# Patient Record
Sex: MALE | Race: WHITE | URBAN - METROPOLITAN AREA
[De-identification: names, ages, dates, MRNs, and addresses within clinical notes are randomized per-mention and may not be internally consistent; named-entity substitution may affect disease eponyms.]

---

## 2018-01-18 ENCOUNTER — OFFICE VISIT (OUTPATIENT)
Dept: URGENT CARE | Facility: URGENT CARE | Age: 28
End: 2018-01-18

## 2018-01-18 VITALS
RESPIRATION RATE: 20 BRPM | DIASTOLIC BLOOD PRESSURE: 69 MMHG | OXYGEN SATURATION: 97 % | SYSTOLIC BLOOD PRESSURE: 121 MMHG | HEART RATE: 96 BPM | WEIGHT: 190.2 LBS | TEMPERATURE: 100 F

## 2018-01-18 DIAGNOSIS — A08.4 VIRAL GASTROENTERITIS: Primary | ICD-10-CM

## 2018-01-18 DIAGNOSIS — R10.84 GENERALIZED ABDOMINAL CRAMPS: ICD-10-CM

## 2018-01-18 PROCEDURE — 99203 OFFICE O/P NEW LOW 30 MIN: CPT | Performed by: PHYSICIAN ASSISTANT

## 2018-01-18 RX ORDER — ONDANSETRON 4 MG/1
4 TABLET, ORALLY DISINTEGRATING ORAL EVERY 8 HOURS PRN
Qty: 12 TABLET | Refills: 0 | Status: SHIPPED | OUTPATIENT
Start: 2018-01-18 | End: 2018-10-19

## 2018-01-18 NOTE — MR AVS SNAPSHOT
After Visit Summary   1/18/2018    Chapincito Barnard    MRN: [id number]           Patient Information     Date Of Birth          1990        Visit Information        Provider Department      1/18/2018 9:35 AM Mirza Patterson PA-C [facility]        Today's Diagnoses     Viral gastroenteritis    -  1    Generalized abdominal cramps          Care Instructions      Noninfectious Gastroenteritis (Ages 6 Years to Adult)    Gastroenteritis can cause nausea, vomiting, diarrhea, and abdominal cramping. This may occur as a result of food sensitivity, inflammation of your gastrointestinal tract, medicines, stress, or other causes not related to infection. Your symptoms will usually last from 1 to 3 days, but can last longer. Antibiotics are not effective, but simple home treatment will be helpful.  Home care  Medicine    You may use acetaminophen or NSAID medicines like ibuprofen or naproxen to control fever, unless another medicine is prescribed. (Note: If you have chronic liver or kidney disease, or ever had a stomach ulcer or gastrointestinalI bleeding, talk with your healthcare provider before using these medicines.) Aspirin should never be used in anyone under 18 years of age who is ill with a fever. It may cause severe liver damage. Don't increase your NSAID medicines if you are already taking these medicines for another condition (like arthritis). Don't use NSAIDS if you are on aspirin (such as for heart disease, or after a stroke).    If medicines for diarrhea or vomiting are prescribed, take only as directed.  General care and preventing spread of the illness    If symptoms are severe, rest at home for the next 24 hours or until you feel better.    Hand washing with soap and water is the best way to prevent the spread of infection. Wash your hands after touching anyone who is sick.    Wash your hands after using the toilet and before meals. Clean the toilet after each  use.    Caffeine, tobacco, and alcohol can make your diarrhea, cramping, and pain worse.  Diet    Water and clear liquids are important so you do not get dehydrated. Drink a small amount at a time.    Do not force yourself to eat, especially if you have cramps, vomiting, or diarrhea. When you finally decide to start eating, do not eat large amounts at a time, even if you are hungry.    If you eat, avoid fatty, greasy, spicy, or fried foods.    Do not eat dairy products if you have diarrhea; they can make the diarrhea worse.  During the first 24 hours (the first full day), follow the diet below:    Beverages: Water, clear liquids, soft drinks without caffeine, like ginger ale; mineral water (plain or flavored); decaffeinated tea and coffee.    Soups: Clear broth, consommé, and bouillon Sports drinks aren't a good choice because they have too much sugar and not enough electrolytes. In this case, commercially available products called oral rehydration solutions are best.    Desserts: Plain gelatin, popsicles, and fruit juice bars.  During the next 24 hours (the second day), you may add the following to the above if you have improved. If not, continue what you did the first day:    Hot cereal, plain toast, bread, rolls, crackers    Plain noodles, rice, mashed potatoes, chicken noodle or rice soup    Unsweetened canned fruit (avoid pineapple), bananas    Limit caffeine and chocolate. No spices or seasonings except salt.  During the next 24 hours    Gradually resume a normal diet, as you feel better and your symptoms improve.    If at any time your symptoms start getting worse, go back to clear liquids until you feel better.  Food preparation    If you have diarrhea, you should not prepare food for others. When you  prepare food for yourself, wash your hands before and after.    Wash your hands after using cutting boards, countertops, and knives that have been in contact with raw food.    Keep uncooked meats away from  cooked and ready-to-eat foods.  Follow-up care  Follow up with your healthcare provider if you are not improving over the next 2 to 3 days, or as advised. If a stool (diarrhea) sample was taken, call for the results as directed.  When to seek medical care  Call your healthcare provider right away if any of these occur:     Increasing abdominal pain or constant lower right abdominal pain    Continued vomiting (unable to keep liquids down)    Frequent diarrhea (more than 5 times a day)    Blood in vomit or stool (black or red color)    Inability to tolerate solid food after a few days.    Dark urine, reduced urine output    Weakness, dizziness    Drowsiness    Fever of 100.4 F (38.0 C) or higher, or as directed by your healthcare provider    New rash  Call 911  Call 911 if any of these occur:    Trouble breathing    Chest pain    Confusion    Severe drowsiness or trouble awakening    Seizure    Stiff neck  Date Last Reviewed: 11/16/2015 2000-2017 [employer]. [address] [place] [address]. All rights reserved. This information is not intended as a substitute for professional medical care. Always follow your healthcare professional's instructions.                Follow-ups after your visit        Who to contact     If you have questions or need follow up information about today's clinic visit or your schedule please contact [facility] directly at [phone].  Normal or non-critical lab and imaging results will be communicated to you by MyChart, letter or phone within 4 business days after the clinic has received the results. If you do not hear from us within 7 days, please contact the clinic through MyChart or phone. If you have a critical or abnormal lab result, we will notify you by phone as soon as possible.  Submit refill requests through [employer] or call your pharmacy and they will forward the refill request to us. Please allow 3 business days for your  "refill to be completed.          Additional Information About Your Visit        GaatuharPixtr Information     Live[employer] lets you send messages to your doctor, view your test results, renew your prescriptions, schedule appointments and more. To sign up, go to [email or web address] . Click on \"Log in\" on the left side of the screen, which will take you to the Welcome page. Then click on \"Sign up Now\" on the right side of the page.     You will be asked to enter the access code listed below, as well as some personal information. Please follow the directions to create your username and password.     Your access code is: [email or web address]  Expires: 2018 10:33 AM     Your access code will  in 90 days. If you need help or a new code, please call your [facility] clinic or [phone].        Care EveryWhere ID     This is your Care EveryWhere ID. This could be used by other organizations to access your [facility] medical records  [id number]        Your Vitals Were     Pulse Temperature Respirations Pulse Oximetry          96 100  F (37.8  C) (Oral) 20 97%         Blood Pressure from Last 3 Encounters:   18 121/69    Weight from Last 3 Encounters:   18 190 lb 3.2 oz (86.3 kg)              Today, you had the following     No orders found for display         Today's Medication Changes          These changes are accurate as of: 18 10:33 AM.  If you have any questions, ask your nurse or doctor.               Start taking these medicines.        Dose/Directions    ondansetron 4 MG ODT tab   Commonly known as:  ZOFRAN ODT   Used for:  Viral gastroenteritis, Generalized abdominal cramps   Started by:  Mirza Patterson PA-C        Dose:  4 mg   Take 1 tablet (4 mg) by mouth every 8 hours as needed for nausea   Quantity:  12 tablet   Refills:  0       ranitidine 150 MG tablet   Commonly known as:  ZANTAC   Used for:  Viral gastroenteritis, Generalized abdominal cramps   Started by:  Mirza Patterson PA-C        Dose:  " 150 mg   Take 1 tablet (150 mg) by mouth 2 times daily   Quantity:  60 tablet   Refills:  1            Where to get your medicines      These medications were sent to [facility] [address]  [address] [address]     Phone:  [phone]     ondansetron 4 MG ODT tab    ranitidine 150 MG tablet                Primary Care Provider Fax #    Physician No Ref-Primary [phone]       No address on file        Equal Access to Services     MARYBETH CHAU : Hadii aad ku hadasho Soomaali, waaxda luqadaha, qaybta kaalmada adeegyada, waxay idiin hayaan adeeg kharadonis lamigel . So [facility] [phone].    ATENCIÓN: Si habla español, tiene a vaughn disposición servicios gratuitos de asistencia lingüística. Llame al [phone].    We comply with applicable federal civil rights laws and [place] laws. We do not discriminate on the basis of race, color, national origin, age, disability, sex, sexual orientation, or gender identity.            Thank you!     Thank you for choosing [facility]  for your care. Our goal is always to provide you with excellent care. Hearing back from our patients is one way we can continue to improve our services. Please take a few minutes to complete the written survey that you may receive in the mail after your visit with us. Thank you!             Your Updated Medication List - Protect others around you: Learn how to safely use, store and throw away your medicines at www.disposemymeds.org.          This list is accurate as of: 1/18/18 10:33 AM.  Always use your most recent med list.                   Brand Name Dispense Instructions for use Diagnosis    ondansetron 4 MG ODT tab    ZOFRAN ODT    12 tablet    Take 1 tablet (4 mg) by mouth every 8 hours as needed for nausea    Viral gastroenteritis, Generalized abdominal cramps       PEPTO-BISMOL PO           ranitidine 150 MG tablet    ZANTAC    60 tablet    Take 1 tablet  (150 mg) by mouth 2 times daily    Viral gastroenteritis, Generalized abdominal cramps       UNABLE TO FIND      MEDICATION NAME: OTC cough med.

## 2018-01-18 NOTE — PROGRESS NOTES
SUBJECTIVE:  Chief Complaint   Patient presents with     Generalized Body Aches     abdominal pain , not sleeping well, nausea, chills , sweating, bodyache, groin area feels sensitive, fever x last night.     Chapincito Barnard is a 27 year old male whose symptoms began last night ago and include nausea, vomiting, stomach cramps, loose stools.    Symptoms are still present and moderate.    Aggravating factors: eating and drinking orange juice.    Alleviating factors:rest  Associated symptoms:  Pain: cramps  Fever: low grade  Diarrhea:  consists of mild loose stools/day and is persisting  Stools: loose  Appetite: decreased  Risk factors: gf has the same symptoms    No past medical history on file.     ALLERGIES  No Known Allergies     Social History   Substance Use Topics     Smoking status: Never Smoker     Smokeless tobacco: Not on file     Alcohol use Not on file       ROS:  CONSTITUTIONAL:POSITIVE  for low grade temp  INTEGUMENTARY/SKIN: NEGATIVE for worrisome rashes, moles or lesions  ENT/MOUTH: NEGATIVE for ear, mouth and throat problems  RESP:NEGATIVE for significant cough or SOB  CV: NEGATIVE for chest pain, palpitations or peripheral edema  GI: Positive for nausea, vomiting and stomach cramps  MUSCULOSKELETAL: NEGATIVE for significant arthralgias or myalgia  NEURO: NEGATIVE for weakness, dizziness or paresthesias    OBJECTIVE:  /69  Pulse 96  Temp 100  F (37.8  C) (Oral)  Resp 20  Wt 190 lb 3.2 oz (86.3 kg)  SpO2 97%  GENERAL APPEARANCE: healthy, alert and no distress  HENT: ear canals and TM's normal.  Nose and mouth without ulcers, erythema or lesions  NECK: supple, nontender, no lymphadenopathy  RESP: lungs clear to auscultation - no rales, rhonchi or wheezes  CV: regular rates and rhythm, normal S1 S2, no murmur noted  ABDOMEN:  soft, nontender, no HSM or masses and bowel sounds normal  NEURO: Normal strength and tone, sensory exam grossly normal,  normal speech and mentation  SKIN: no suspicious  "lesions or rashes    ASSESSMENT/PLAN\"      ICD-10-CM    1. Viral gastroenteritis A08.4 ondansetron (ZOFRAN ODT) 4 MG ODT tab     ranitidine (ZANTAC) 150 MG tablet   2. Generalized abdominal cramps R10.84 ondansetron (ZOFRAN ODT) 4 MG ODT tab     ranitidine (ZANTAC) 150 MG tablet       PLAN:  Diet: BRAT diet, advance diet as tolerated and small amounts clear fluids frequently,soups,juices,water,advance diet as tolerated  Orders Placed This Encounter             ondansetron (ZOFRAN ODT) 4 MG ODT tab     ranitidine (ZANTAC) 150 MG tablet         Follow-up with PCP if not improving    "

## 2018-01-18 NOTE — PATIENT INSTRUCTIONS
Noninfectious Gastroenteritis (Ages 6 Years to Adult)    Gastroenteritis can cause nausea, vomiting, diarrhea, and abdominal cramping. This may occur as a result of food sensitivity, inflammation of your gastrointestinal tract, medicines, stress, or other causes not related to infection. Your symptoms will usually last from 1 to 3 days, but can last longer. Antibiotics are not effective, but simple home treatment will be helpful.  Home care  Medicine    You may use acetaminophen or NSAID medicines like ibuprofen or naproxen to control fever, unless another medicine is prescribed. (Note: If you have chronic liver or kidney disease, or ever had a stomach ulcer or gastrointestinalI bleeding, talk with your healthcare provider before using these medicines.) Aspirin should never be used in anyone under 18 years of age who is ill with a fever. It may cause severe liver damage. Don't increase your NSAID medicines if you are already taking these medicines for another condition (like arthritis). Don't use NSAIDS if you are on aspirin (such as for heart disease, or after a stroke).    If medicines for diarrhea or vomiting are prescribed, take only as directed.  General care and preventing spread of the illness    If symptoms are severe, rest at home for the next 24 hours or until you feel better.    Hand washing with soap and water is the best way to prevent the spread of infection. Wash your hands after touching anyone who is sick.    Wash your hands after using the toilet and before meals. Clean the toilet after each use.    Caffeine, tobacco, and alcohol can make your diarrhea, cramping, and pain worse.  Diet    Water and clear liquids are important so you do not get dehydrated. Drink a small amount at a time.    Do not force yourself to eat, especially if you have cramps, vomiting, or diarrhea. When you finally decide to start eating, do not eat large amounts at a time, even if you are hungry.    If you eat, avoid  fatty, greasy, spicy, or fried foods.    Do not eat dairy products if you have diarrhea; they can make the diarrhea worse.  During the first 24 hours (the first full day), follow the diet below:    Beverages: Water, clear liquids, soft drinks without caffeine, like ginger ale; mineral water (plain or flavored); decaffeinated tea and coffee.    Soups: Clear broth, consommé, and bouillon Sports drinks aren't a good choice because they have too much sugar and not enough electrolytes. In this case, commercially available products called oral rehydration solutions are best.    Desserts: Plain gelatin, popsicles, and fruit juice bars.  During the next 24 hours (the second day), you may add the following to the above if you have improved. If not, continue what you did the first day:    Hot cereal, plain toast, bread, rolls, crackers    Plain noodles, rice, mashed potatoes, chicken noodle or rice soup    Unsweetened canned fruit (avoid pineapple), bananas    Limit caffeine and chocolate. No spices or seasonings except salt.  During the next 24 hours    Gradually resume a normal diet, as you feel better and your symptoms improve.    If at any time your symptoms start getting worse, go back to clear liquids until you feel better.  Food preparation    If you have diarrhea, you should not prepare food for others. When you  prepare food for yourself, wash your hands before and after.    Wash your hands after using cutting boards, countertops, and knives that have been in contact with raw food.    Keep uncooked meats away from cooked and ready-to-eat foods.  Follow-up care  Follow up with your healthcare provider if you are not improving over the next 2 to 3 days, or as advised. If a stool (diarrhea) sample was taken, call for the results as directed.  When to seek medical care  Call your healthcare provider right away if any of these occur:     Increasing abdominal pain or constant lower right abdominal pain    Continued  vomiting (unable to keep liquids down)    Frequent diarrhea (more than 5 times a day)    Blood in vomit or stool (black or red color)    Inability to tolerate solid food after a few days.    Dark urine, reduced urine output    Weakness, dizziness    Drowsiness    Fever of 100.4 F (38.0 C) or higher, or as directed by your healthcare provider    New rash  Call 911  Call 911 if any of these occur:    Trouble breathing    Chest pain    Confusion    Severe drowsiness or trouble awakening    Seizure    Stiff neck  Date Last Reviewed: 11/16/2015 2000-2017 [employer]. [address] [place] [address]. All rights reserved. This information is not intended as a substitute for professional medical care. Always follow your healthcare professional's instructions.

## 2018-07-23 ENCOUNTER — TRANSFERRED RECORDS (OUTPATIENT)
Dept: HEALTH INFORMATION MANAGEMENT | Facility: CLINIC | Age: 28
End: 2018-07-23

## 2018-07-27 DIAGNOSIS — R06.83 SNORING: Primary | ICD-10-CM

## 2018-07-27 DIAGNOSIS — G47.30 SLEEP APNEA: ICD-10-CM

## 2018-09-06 ENCOUNTER — OFFICE VISIT (OUTPATIENT)
Dept: SLEEP MEDICINE | Facility: CLINIC | Age: 28
End: 2018-09-06
Attending: OTOLARYNGOLOGY
Payer: COMMERCIAL

## 2018-09-06 DIAGNOSIS — G47.33 OSA (OBSTRUCTIVE SLEEP APNEA): Primary | ICD-10-CM

## 2018-09-06 DIAGNOSIS — G47.33 OBSTRUCTIVE SLEEP APNEA SYNDROME: ICD-10-CM

## 2018-09-06 DIAGNOSIS — R06.83 SNORING: ICD-10-CM

## 2018-09-06 NOTE — MR AVS SNAPSHOT
After Visit Summary   9/6/2018    Chapincito Barnard    MRN: [id number]           Patient Information     Date Of Birth          1990        Visit Information        Provider Department      9/6/2018 8:30 AM Ned Gamble MD [facility]        Today's Diagnoses     LADARIUS (obstructive sleep apnea)    -  1    Snoring        Sleep apnea           Follow-ups after your visit        Your next 10 appointments already scheduled     Sep 20, 2018  3:30 PM CDT   New Sleep Patient with Epi Guerin MD   [facility] ([facility] [place])    [address]  [facility] [address]   [phone]            Oct 24, 2018  8:30 PM CDT   PSG Split with BED 5 SH SLEEP   [facility]    [address]  [facility] [address]   [phone]            Nov 07, 2018  4:45 PM CST   Telephone Visit with Epi Guerin MD   [facility]    [address]  [facility] [address]   [phone]           Note: this is not an onsite visit; there is no need to come to the facility.              Future tests that were ordered for you today     Open Future Orders        Priority Expected Expires Ordered    Comprehensive Sleep Study Routine  3/5/2019 9/6/2018            Who to contact     If you have questions or need follow up information about today's clinic visit or your schedule please contact [facility] directly at [phone].  Normal or non-critical lab and imaging results will be communicated to you by MyChart, letter or phone within 4 business days after the clinic has received the results. If you do not hear from us within 7 days, please contact the clinic through MyChart or phone. If you have a critical or abnormal lab result, we will notify you by phone as soon as possible.  Submit refill requests  "through [employer] or call your pharmacy and they will forward the refill request to us. Please allow 3 business days for your refill to be completed.          Additional Information About Your Visit        Skycrosshart Information     [employer] lets you send messages to your doctor, view your test results, renew your prescriptions, schedule appointments and more. To sign up, go to [email or web address] . Click on \"Log in\" on the left side of the screen, which will take you to the Welcome page. Then click on \"Sign up Now\" on the right side of the page.     You will be asked to enter the access code listed below, as well as some personal information. Please follow the directions to create your username and password.     Your access code is: [email or web address]  Expires: 2018  8:22 AM     Your access code will  in 90 days. If you need help or a new code, please call your [facility] clinic or [phone].        Care EveryWhere ID     This is your Care EveryWhere ID. This could be used by other organizations to access your [facility] medical records  [id number]         Blood Pressure from Last 3 Encounters:   18 121/69    Weight from Last 3 Encounters:   18 86.3 kg (190 lb 3.2 oz)              We Performed the Following     SLEEP EVALUATION & MANAGEMENT REFERRAL - ADULT -[facility] [phone]  (Age 18 and up)        Primary Care Provider    None Specified       No primary provider on file.        Equal Access to Services     MARYBETH CHAU : Hadii boo nieves Sokhadra, waaxda luqadaha, qaybta kaalmada bharathi carrillo . So [facility] [phone].    ATENCIÓN: Si habla español, tiene a vaughn disposición servicios gratuitos de asistencia lingüística. Irwin al [phone].    We comply with applicable federal civil rights laws and [place] laws. We do not discriminate on the basis of race, color, national origin, age, disability, sex, sexual orientation, or " gender identity.            Thank you!     Thank you for choosing [facility]  for your care. Our goal is always to provide you with excellent care. Hearing back from our patients is one way we can continue to improve our services. Please take a few minutes to complete the written survey that you may receive in the mail after your visit with us. Thank you!             Your Updated Medication List - Protect others around you: Learn how to safely use, store and throw away your medicines at www.disposemymeds.org.          This list is accurate as of 9/6/18  8:53 AM.  Always use your most recent med list.                   Brand Name Dispense Instructions for use Diagnosis    ondansetron 4 MG ODT tab    ZOFRAN ODT    12 tablet    Take 1 tablet (4 mg) by mouth every 8 hours as needed for nausea    Viral gastroenteritis, Generalized abdominal cramps       PEPTO-BISMOL PO           ranitidine 150 MG tablet    ZANTAC    60 tablet    Take 1 tablet (150 mg) by mouth 2 times daily    Viral gastroenteritis, Generalized abdominal cramps       UNABLE TO FIND      MEDICATION NAME: OTC cough med.

## 2018-09-06 NOTE — PROGRESS NOTES
Order placed for in lab PSG to evaluate for sleep disordered breathing in seting of loud snoring and nasal obstruction.      Patients sleep provider was unable to see patient today so I have discussed briefly with the patient the goals which included characterizing sleep disordered breathing for ENT team.     We will both schedule a study now which will come about 2-3 months out as well as a new patient visit in the meanwhile.  If it turns out the patient does not need a study then the attending provider can cancel if necessary but by placing order now we can get him on the schedule so he does not need to wait.     Patient is somewhat sleepy during the day, indicates he is safe while driving, and understands the importance of never driving if tired or sleepy.

## 2018-10-19 ENCOUNTER — OFFICE VISIT (OUTPATIENT)
Dept: SLEEP MEDICINE | Facility: CLINIC | Age: 28
End: 2018-10-19
Attending: OTOLARYNGOLOGY
Payer: COMMERCIAL

## 2018-10-19 VITALS
TEMPERATURE: 98.2 F | RESPIRATION RATE: 16 BRPM | HEIGHT: 72 IN | WEIGHT: 210 LBS | SYSTOLIC BLOOD PRESSURE: 115 MMHG | BODY MASS INDEX: 28.44 KG/M2 | OXYGEN SATURATION: 97 % | HEART RATE: 84 BPM | DIASTOLIC BLOOD PRESSURE: 72 MMHG

## 2018-10-19 DIAGNOSIS — G47.33 OBSTRUCTIVE SLEEP APNEA SYNDROME: Primary | ICD-10-CM

## 2018-10-19 PROCEDURE — 99203 OFFICE O/P NEW LOW 30 MIN: CPT | Performed by: INTERNAL MEDICINE

## 2018-10-19 NOTE — NURSING NOTE
Chief Complaint   Patient presents with     Sleep Problem       Initial /72  Pulse 84  Temp 98.2  F (36.8  C) (Oral)  Resp 16  Ht 1.829 m (6')  Wt 95.3 kg (210 lb)  SpO2 97%  BMI 28.48 kg/m2 Estimated body mass index is 28.48 kg/(m^2) as calculated from the following:    Height as of this encounter: 1.829 m (6').    Weight as of this encounter: 95.3 kg (210 lb).    Medication Reconciliation: complete     ESS

## 2018-10-19 NOTE — MR AVS SNAPSHOT
After Visit Summary   10/19/2018    Chapincito Barnard    MRN: [id number]           Patient Information     Date Of Birth          1990        Visit Information        Provider Department      10/19/2018 1:30 PM Ned Gamble MD [facility]        Today's Diagnoses     Obstructive sleep apnea syndrome    -  1       Follow-ups after your visit        Your next 10 appointments already scheduled     Oct 24, 2018  8:30 PM CDT   PSG Split with BED 5 SH SLEEP   [facility] ([facility])    [address]  [place] [id number] [phone]            Nov 07, 2018  4:45 PM CST   Telephone Visit with Epi Guerin MD   [facility] ([facility])    [address]  [place] [id number] [phone]           Note: this is not an onsite visit; there is no need to come to the facility.              Future tests that were ordered for you today     Open Future Orders        Priority Expected Expires Ordered    Comprehensive Sleep Study Routine  4/17/2019 10/19/2018            Who to contact     If you have questions or need follow up information about today's clinic visit or your schedule please contact [facility] directly at [phone].  Normal or non-critical lab and imaging results will be communicated to you by MyChart, letter or phone within 4 business days after the clinic has received the results. If you do not hear from us within 7 days, please contact the clinic through [employer] or phone. If you have a critical or abnormal lab result, we will notify you by phone as soon as possible.  Submit refill requests through [employer] or call your pharmacy and they will forward the refill request to us. Please allow 3 business days for your refill to be completed.          Additional Information About Your Visit        [employer] Information     [employer] lets you  "send messages to your doctor, view your test results, renew your prescriptions, schedule appointments and more. To sign up, go to [email or web address] . Click on \"Log in\" on the left side of the screen, which will take you to the Welcome page. Then click on \"Sign up Now\" on the right side of the page.     You will be asked to enter the access code listed below, as well as some personal information. Please follow the directions to create your username and password.     Your access code is: [email or web address]  Expires: 2018  8:22 AM     Your access code will  in 90 days. If you need help or a new code, please call your [place] clinic or [phone].        Care EveryWhere ID     This is your Care EveryWhere ID. This could be used by other organizations to access your [facility] medical records  [id number]        Your Vitals Were     Pulse Temperature Respirations Height Pulse Oximetry BMI (Body Mass Index)    84 98.2  F (36.8  C) (Oral) 16 1.829 m (6') 97% 28.48 kg/m2       Blood Pressure from Last 3 Encounters:   10/19/18 115/72   18 121/69    Weight from Last 3 Encounters:   10/19/18 95.3 kg (210 lb)   18 86.3 kg (190 lb 3.2 oz)               Primary Care Provider Fax #    Physician No Ref-Primary [phone]       No address on file        Equal Access to Services     [facility]: Hadii boo nicko Sojuan franciscoali, waaxda luqadaha, qaybta kaalmada adeegyada, bharathi bernardo. So [facility] [phone].    ATENCIÓN: Si habla español, tiene a vaughn disposición servicios gratuitos de asistencia lingüística. Llame al [phone].    We comply with applicable federal civil rights laws and [place] laws. We do not discriminate on the basis of race, color, national origin, age, disability, sex, sexual orientation, or gender identity.            Thank you!     Thank you for choosing [facility]  for your care. Our goal is always to provide you with excellent care. " Hearing back from our patients is one way we can continue to improve our services. Please take a few minutes to complete the written survey that you may receive in the mail after your visit with us. Thank you!             Your Updated Medication List - Protect others around you: Learn how to safely use, store and throw away your medicines at www.disposemymeds.org.      Notice  As of 10/19/2018  2:25 PM    You have not been prescribed any medications.

## 2018-10-19 NOTE — PROGRESS NOTES
Sleep Consultation:    Date on this visit: 10/19/2018    Chapincito Barnard is sent by Mateus Rodriguez for a sleep consultation regarding snoring and throat pain.    Primary Physician: No Ref-Primary, Physician     He has no significant past medical history.    Schedule - Works as  for [employer].  Typically working 4 x 10 hour shifts M - Th (6:30 - 7:30 AM to 6:30 PM).  Into bed around 8:30 - 9 PM and waking around 5:40 AM (trying to get up earlier to go work out).    Trying to avoid electronics for 1 hour before bed.    Sleep Disordered Breathing - Snoring is loud and disrupting wife's sleep (which is especially problematic now that she is pregnant).  Does have snort arousals and witnessed apneas.   Patient was counseled on the importance of driving while alert, to pull over if drowsy, or nap before getting into the vehicle if sleepy.    Lives with wife.  Has 3 pets, dogs.     Does have family history of Obstructive Sleep Apnea in mother.    Allergies:    No Known Allergies    Medications:    No current outpatient prescriptions on file.       Problem List:  There are no active problems to display for this patient.       Past Medical/Surgical History:  No past medical history on file.  No past surgical history on file.    Social History:  Social History     Social History     Marital status:      Spouse name: N/A     Number of children: N/A     Years of education: N/A     Occupational History     Not on file.     Social History Main Topics     Smoking status: Never Smoker     Smokeless tobacco: Never Used     Alcohol use Not on file     Drug use: Not on file     Sexual activity: Not on file     Other Topics Concern     Not on file     Social History Narrative     Family History:  No family history on file.    Review of Systems:  A complete review of systems reviewed by me is negative with the exeption of what has been mentioned in the history of present illness.  Sinus pain    Physical  Examination:  Vitals: /72  Pulse 84  Temp 98.2  F (36.8  C) (Oral)  Resp 16  Ht 1.829 m (6')  Wt 95.3 kg (210 lb)  SpO2 97%  BMI 28.48 kg/m2  BMI= Body mass index is 28.48 kg/(m^2).    Neck Cir (cm): 43 cm    [place] Total Score 10/19/2018   Total score - [place] 8     ZULMA Total Score: 10 (10/19/18 1400)    Impression/Plan:  1. Obstructive sleep apnea syndrome  I have a high suspicion for LADARIUS based on presence of snoring, snort arousals, witnessed apneas, enlarged neck girth >= 43 cm for male, and excessive daytime sleepiness. We discussed pathophysiology of obstructive sleep apnea, testing with overnight polysomnography, and treatment modalities (CPAP only discussed at this visit). We discussed consequences of untreated LADARIUS. Patient is interested in treatment and willing to undergo overnight sleep testing.  Study has been ordered today.    - Comprehensive Sleep Study; Future    Literature provided regarding sleep apnea.      He will follow up with me in approximately two weeks after his sleep study has been competed to review the results and discuss plan of care.       Polysomnography reviewed.  Obstructive sleep apnea reviewed.  Complications of untreated sleep apnea were reviewed.    Total time of care was 30 minutes, with > 50% of time spent on counseling and coordination of care.      Ned Gamble MD, Sleep Physician  Oct 19, 2018     CC: Mateus Rodriguez

## 2018-10-24 ENCOUNTER — THERAPY VISIT (OUTPATIENT)
Dept: SLEEP MEDICINE | Facility: CLINIC | Age: 28
End: 2018-10-24
Payer: COMMERCIAL

## 2018-10-24 DIAGNOSIS — G47.33 OBSTRUCTIVE SLEEP APNEA SYNDROME: ICD-10-CM

## 2018-10-24 PROCEDURE — 95810 POLYSOM 6/> YRS 4/> PARAM: CPT | Performed by: INTERNAL MEDICINE

## 2018-10-24 NOTE — MR AVS SNAPSHOT
"              After Visit Summary   10/24/2018    Chapincito Barnard    MRN: [id number]           Patient Information     Date Of Birth          1990        Visit Information        Provider Department      10/24/2018 8:30 PM BED 5 SH SLEEP [facility]        Today's Diagnoses     Obstructive sleep apnea syndrome           Follow-ups after your visit        Your next 10 appointments already scheduled     Nov 07, 2018  4:45 PM CST   Telephone Visit with Epi Guerin MD   [facility] ([facility])    [address] [id number] [phone]           Note: this is not an onsite visit; there is no need to come to the facility.              Who to contact     If you have questions or need follow up information about today's clinic visit or your schedule please contact [facility] directly at [phone].  Normal or non-critical lab and imaging results will be communicated to you by MyChart, letter or phone within 4 business days after the clinic has received the results. If you do not hear from us within 7 days, please contact the clinic through MyChart or phone. If you have a critical or abnormal lab result, we will notify you by phone as soon as possible.  Submit refill requests through [employer] or call your pharmacy and they will forward the refill request to us. Please allow 3 business days for your refill to be completed.          Additional Information About Your Visit        MyChart Information     [employer] lets you send messages to your doctor, view your test results, renew your prescriptions, schedule appointments and more. To sign up, go to [email or web address] . Click on \"Log in\" on the left side of the screen, which will take you to the Welcome page. Then click on \"Sign up Now\" on the right side of the page.     You will be asked to enter the access code listed below, as well as some personal information. " Please follow the directions to create your username and password.     Your access code is: [email or web address]  Expires: 2018  8:22 AM     Your access code will  in 90 days. If you need help or a new code, please call your [facility] clinic or [phone].        Care EveryWhere ID     This is your Care EveryWhere ID. This could be used by other organizations to access your [facility] medical records  [id number]         Blood Pressure from Last 3 Encounters:   10/19/18 115/72   18 121/69    Weight from Last 3 Encounters:   10/19/18 95.3 kg (210 lb)   18 86.3 kg (190 lb 3.2 oz)              We Performed the Following     Comprehensive Sleep Study        Primary Care Provider Fax #    Physician No Ref-Primary [phone]       No address on file        Equal Access to Services     [facility]: Hadrachael Cabral, lesa sharp, britney moranalmahalima carrillo, bharathi gates . So [facility] [phone].    ATENCIÓN: Si habla español, tiene a vaughn disposición servicios gratuitos de asistencia lingüística. Llame al [phone].    We comply with applicable federal civil rights laws and [place] laws. We do not discriminate on the basis of race, color, national origin, age, disability, sex, sexual orientation, or gender identity.            Thank you!     Thank you for choosing [facility]  for your care. Our goal is always to provide you with excellent care. Hearing back from our patients is one way we can continue to improve our services. Please take a few minutes to complete the written survey that you may receive in the mail after your visit with us. Thank you!             Your Updated Medication List - Protect others around you: Learn how to safely use, store and throw away your medicines at www.disposemymeds.org.      Notice  As of 10/24/2018 11:59 PM    You have not been prescribed any medications.

## 2018-10-26 LAB — SLPCOMP: NORMAL

## 2018-10-26 NOTE — PROCEDURES
SLEEP STUDY INTERPRETATION  DIAGNOSTIC POLYSOMNOGRAPHY REPORT      Patient: THOM BENOIT  YOB: 1990  Study Date: 10/24/2018  MRN: [id number]  Referring Provider: -  Ordering Provider: LARY LUNA MD    Indications for Polysomnography: The patient is a 28 y old Male who is 6' and weighs 210.0 lbs. His BMI is 28.8, Derwood sleepiness scale 8.0 and neck circumference is 43.0 cm. Relevant medical history includes nothing. A diagnostic polysomnogram was performed to evaluate for LADARIUS.    Polysomnogram Data: A full night polysomnogram recorded the standard physiologic parameters including EEG, EOG, EMG, ECG, nasal and oral airflow. Respiratory parameters of chest and abdominal movements were recorded with respiratory inductance plethysmography. Oxygen saturation was recorded by pulse oximetry. Hypopnea scoring rule used: 1B 4%.    Sleep Architecture: Normal sleep latency without sleep aid, normal arousal index, and normal sleep efficiency.  The total recording time of the polysomnogram was 452.0 minutes. The total sleep time was 398.5 minutes. Sleep latency was normal at 10.6 minutes without the use of a sleep aid. REM latency was 292.0 minutes. Arousal index was normal at 17.6 arousals per hour. Sleep efficiency was normal at 88.2%. Wake after sleep onset was 42.5 minutes. The patient spent 4.6% of total sleep time in Stage N1, 56.5% in Stage N2, 23.1% in Stage N3, and 15.8% in REM. Time in REM supine was 38.5 minutes.    Respiration: Snoring without significant sleep-disordered breathing.    Events ? The polysomnogram revealed a presence of 7 obstructive, 8 central, and - mixed apneas resulting in an apnea index of 2.3 events per hour. There were 3 obstructive hypopneas and - central hypopneas resulting in an obstructive hypopnea index of 0.5 and central hypopnea index of - events per hour. The combined apnea/hypopnea index was 2.7 events per hour (central apnea/hypopnea index was 1.2 events per  hour). The REM AHI was 13.3 events per hour. The supine AHI was 3.3 events per hour. The RERA index was 3.0 events per hour.  The RDI was 5.7 events per hour.    Snoring - was reported as mild to moderate.    Respiratory rate and pattern - was normal.    Sustained Sleep Associated Hypoventilation - Transcutaneous carbon dioxide monitoring was not used; however significant hypoventilation was not suggested by oximetry.    Sleep Associated Hypoxemia - (Greater than 5 minutes O2 sat at or below 88%) was/was not present. Baseline oxygen saturation was 96.4%. Lowest oxygen saturation was 90.2%. Time spent less than or equal to 88% was 0 minutes. Time spent less than or equal to 89% was 0 minutes.    Movement Activity: Few PLMs without evidence of clinical significance.    Periodic Limb Activity - There were 14 PLMs during the entire study. The PLM index was 2.1 movements per hour. The PLM Arousal Index was - per hour.    REM EMG Activity - Excessive muscle activity was not present.    Nocturnal Behavior - Abnormal sleep related behaviors were not noted.    Bruxism - None apparent.    Cardiac Summary: No significant arrhythmias noted.  The average pulse rate was 69.4 bpm. The minimum pulse rate was 45.9 bpm while the maximum pulse rate was 116.0 bpm.      Assessment:     Normal sleep latency without sleep aid, normal arousal index, and normal sleep efficiency.    Snoring without significant sleep-disordered breathing.    Few PLMs without evidence of clinical significance.    No significant arrhythmias noted.    Recommendations:    Advice regarding the risks of drowsy driving.    Suggest optimizing sleep schedule and avoiding sleep deprivation.    Pharmacologic therapy should be used for management of restless legs syndrome only if present and clinically indicated and not based on the presence of periodic limb movements alone.    Diagnostic Codes:   Unspecified Sleep Disturbance G47.9    _____________________________________   Electronically Signed By: Ned Gamble MD 10/25/2018

## 2018-11-07 ENCOUNTER — VIRTUAL VISIT (OUTPATIENT)
Dept: SLEEP MEDICINE | Facility: CLINIC | Age: 28
End: 2018-11-07
Payer: COMMERCIAL

## 2018-11-07 DIAGNOSIS — R06.83 SNORING: Primary | ICD-10-CM

## 2018-11-07 PROCEDURE — 99441 ZZC PHYSICIAN TELEPHONE EVALUATION 5-10 MIN: CPT | Performed by: INTERNAL MEDICINE

## 2018-11-07 NOTE — MR AVS SNAPSHOT
"              After Visit Summary   11/7/2018    Chapincito Barnard    MRN: [id number]           Patient Information     Date Of Birth          1990        Visit Information        Provider Department      11/7/2018 4:45 PM Epi Guerin MD [facility]        Today's Diagnoses     Snoring    -  1       Follow-ups after your visit        Your next 10 appointments already scheduled     Nov 07, 2018  4:45 PM CST   Telephone Visit with Epi Guerin MD   [facility] ([facility])    [address] [id number] [phone]           Note: this is not an onsite visit; there is no need to come to the facility.              Who to contact     If you have questions or need follow up information about today's clinic visit or your schedule please contact [facility] directly at [phone].  Normal or non-critical lab and imaging results will be communicated to you by MyChart, letter or phone within 4 business days after the clinic has received the results. If you do not hear from us within 7 days, please contact the clinic through MyChart or phone. If you have a critical or abnormal lab result, we will notify you by phone as soon as possible.  Submit refill requests through [employer] or call your pharmacy and they will forward the refill request to us. Please allow 3 business days for your refill to be completed.          Additional Information About Your Visit        MyChart Information     [employer] lets you send messages to your doctor, view your test results, renew your prescriptions, schedule appointments and more. To sign up, go to [email or web address] . Click on \"Log in\" on the left side of the screen, which will take you to the Welcome page. Then click on \"Sign up Now\" on the right side of the page.     You will be asked to enter the access code listed below, as well as some personal information. Please follow the " directions to create your username and password.     Your access code is: [email or web address]  Expires: 2018  7:22 AM     Your access code will  in 90 days. If you need help or a new code, please call your [facility] clinic or [phone].        Care EveryWhere ID     This is your Care EveryWhere ID. This could be used by other organizations to access your [facility] medical records  [id number]         Blood Pressure from Last 3 Encounters:   10/19/18 115/72   18 121/69    Weight from Last 3 Encounters:   10/19/18 95.3 kg (210 lb)   18 86.3 kg (190 lb 3.2 oz)              Today, you had the following     No orders found for display       Primary Care Provider Fax #    Physician No Ref-Primary [phone]       No address on file        Equal Access to Services     [facility]: Hadii boo Cabral, waaxda luloreleiadaha, qaybta kaalmada adezandra, bharathi gates . So [facility] [phone].    ATENCIÓN: Si habla español, tiene a vaughn disposición servicios gratuitos de asistencia lingüística. Llame al [phone].    We comply with applicable federal civil rights laws and [place] laws. We do not discriminate on the basis of race, color, national origin, age, disability, sex, sexual orientation, or gender identity.            Thank you!     Thank you for choosing [place] [facility]  for your care. Our goal is always to provide you with excellent care. Hearing back from our patients is one way we can continue to improve our services. Please take a few minutes to complete the written survey that you may receive in the mail after your visit with us. Thank you!             Your Updated Medication List - Protect others around you: Learn how to safely use, store and throw away your medicines at www.disposemymeds.org.      Notice  As of 2018  2:58 PM    You have not been prescribed any medications.

## 2018-11-07 NOTE — PROGRESS NOTES
Phone Sleep Study Follow-Up :    Date on this visit: 11/7/2018    Chapincito Barnard was contacted today for follow-up of his sleep study done on 10/24/2018 at the [facility] for possible sleep apnea.    Sleep latency 10.6 minutes without Ambien.  REM achieved.   REM latency 292 minutes.  Sleep efficiency 88%. Total sleep time 398.5 minutes.    Sleep architecture:  Stage 1, 4.6% (5%), stage 2, 56.5% (45-55%), stage 3, 23% (15-20%), stage REM, 15.8% (20-25%).  AHI was 2.7, without significant desaturations. RDI 5.7.  REM AHI  13.3, consistent with mild REM LADARIUS.  Supine AHI  3.3, consistent with no SUPINE LADARIUS.  Periodic Limb Movement Index 2.1/hour.       These findings were reviewed with patient.     Past medical/surgical history, family history, social history, medications and allergies were reviewed.      Problem List:  There are no active problems to display for this patient.       Impression/Plan:    1. Sleep study is negative for clinically significant sleep apnea    2. Snoring     - Patient was counseled regarding absence of clinically significant sleep apnea. He is planning nasal surgery and has consulted Dr. Rodriguez.     - We discussed oral appliance for snoring if socially disruptive snoring remains despite nasal surgery.       Six minutes spent with patient, all of which were spent face-to-face counseling, consulting, coordinating plan of care.      Dr. Epi Guerin     CC: No Ref-Primary, Physician

## 2021-04-15 ENCOUNTER — IMMUNIZATION (OUTPATIENT)
Dept: NURSING | Facility: CLINIC | Age: 31
End: 2021-04-15
Payer: COMMERCIAL

## 2021-04-15 PROCEDURE — 91300 PR COVID VAC PFIZER DIL RECON 30 MCG/0.3 ML IM: CPT

## 2021-04-15 PROCEDURE — 0001A PR COVID VAC PFIZER DIL RECON 30 MCG/0.3 ML IM: CPT

## 2021-05-02 ENCOUNTER — HEALTH MAINTENANCE LETTER (OUTPATIENT)
Age: 31
End: 2021-05-02

## 2021-05-13 ENCOUNTER — IMMUNIZATION (OUTPATIENT)
Dept: NURSING | Facility: CLINIC | Age: 31
End: 2021-05-13
Attending: INTERNAL MEDICINE
Payer: COMMERCIAL

## 2021-05-13 PROCEDURE — 91300 PR COVID VAC PFIZER DIL RECON 30 MCG/0.3 ML IM: CPT

## 2021-05-13 PROCEDURE — 0002A PR COVID VAC PFIZER DIL RECON 30 MCG/0.3 ML IM: CPT

## 2021-10-17 ENCOUNTER — HEALTH MAINTENANCE LETTER (OUTPATIENT)
Age: 31
End: 2021-10-17

## 2022-05-29 ENCOUNTER — HEALTH MAINTENANCE LETTER (OUTPATIENT)
Age: 32
End: 2022-05-29

## 2022-10-02 ENCOUNTER — HEALTH MAINTENANCE LETTER (OUTPATIENT)
Age: 32
End: 2022-10-02

## 2023-05-16 ENCOUNTER — APPOINTMENT (RX ONLY)
Dept: URBAN - METROPOLITAN AREA CLINIC 142 | Facility: CLINIC | Age: 33
Setting detail: DERMATOLOGY
End: 2023-05-16

## 2023-05-16 DIAGNOSIS — L73.9 FOLLICULAR DISORDER, UNSPECIFIED: ICD-10-CM

## 2023-05-16 DIAGNOSIS — L738 OTHER SPECIFIED DISEASES OF HAIR AND HAIR FOLLICLES: ICD-10-CM

## 2023-05-16 DIAGNOSIS — D18.0 HEMANGIOMA: ICD-10-CM

## 2023-05-16 DIAGNOSIS — D22 MELANOCYTIC NEVI: ICD-10-CM

## 2023-05-16 DIAGNOSIS — L81.4 OTHER MELANIN HYPERPIGMENTATION: ICD-10-CM

## 2023-05-16 DIAGNOSIS — L663 OTHER SPECIFIED DISEASES OF HAIR AND HAIR FOLLICLES: ICD-10-CM

## 2023-05-16 PROBLEM — L02.222 FURUNCLE OF BACK [ANY PART, EXCEPT BUTTOCK]: Status: ACTIVE | Noted: 2023-05-16

## 2023-05-16 PROBLEM — D18.01 HEMANGIOMA OF SKIN AND SUBCUTANEOUS TISSUE: Status: ACTIVE | Noted: 2023-05-16

## 2023-05-16 PROBLEM — D22.5 MELANOCYTIC NEVI OF TRUNK: Status: ACTIVE | Noted: 2023-05-16

## 2023-05-16 PROBLEM — L02.12 FURUNCLE OF NECK: Status: ACTIVE | Noted: 2023-05-16

## 2023-05-16 PROBLEM — D22.61 MELANOCYTIC NEVI OF RIGHT UPPER LIMB, INCLUDING SHOULDER: Status: ACTIVE | Noted: 2023-05-16

## 2023-05-16 PROBLEM — D22.62 MELANOCYTIC NEVI OF LEFT UPPER LIMB, INCLUDING SHOULDER: Status: ACTIVE | Noted: 2023-05-16

## 2023-05-16 PROCEDURE — ? SUNSCREEN RECOMMENDATIONS

## 2023-05-16 PROCEDURE — 99203 OFFICE O/P NEW LOW 30 MIN: CPT

## 2023-05-16 PROCEDURE — ? COUNSELING

## 2023-05-16 ASSESSMENT — LOCATION SIMPLE DESCRIPTION DERM
LOCATION SIMPLE: RIGHT FOREHEAD
LOCATION SIMPLE: RIGHT UPPER ARM
LOCATION SIMPLE: POSTERIOR NECK
LOCATION SIMPLE: LEFT SHOULDER
LOCATION SIMPLE: LEFT UPPER ARM
LOCATION SIMPLE: LEFT UPPER BACK
LOCATION SIMPLE: LEFT LOWER BACK
LOCATION SIMPLE: RIGHT SHOULDER
LOCATION SIMPLE: RIGHT UPPER BACK

## 2023-05-16 ASSESSMENT — SEVERITY ASSESSMENT: SEVERITY: ALMOST CLEAR

## 2023-05-16 ASSESSMENT — LOCATION DETAILED DESCRIPTION DERM
LOCATION DETAILED: LEFT INFERIOR LATERAL MIDBACK
LOCATION DETAILED: RIGHT DISTAL POSTERIOR UPPER ARM
LOCATION DETAILED: RIGHT MEDIAL FOREHEAD
LOCATION DETAILED: RIGHT POSTERIOR SHOULDER
LOCATION DETAILED: LEFT PROXIMAL POSTERIOR UPPER ARM
LOCATION DETAILED: LEFT SUPERIOR POSTERIOR NECK
LOCATION DETAILED: RIGHT SUPERIOR UPPER BACK
LOCATION DETAILED: LEFT DISTAL POSTERIOR UPPER ARM
LOCATION DETAILED: LEFT POSTERIOR SHOULDER
LOCATION DETAILED: LEFT INFERIOR LATERAL UPPER BACK
LOCATION DETAILED: LEFT SUPERIOR UPPER BACK
LOCATION DETAILED: RIGHT SUPERIOR POSTERIOR NECK
LOCATION DETAILED: LEFT MEDIAL UPPER BACK

## 2023-05-16 ASSESSMENT — LOCATION ZONE DERM
LOCATION ZONE: ARM
LOCATION ZONE: TRUNK
LOCATION ZONE: FACE
LOCATION ZONE: NECK

## 2023-05-16 ASSESSMENT — PAIN INTENSITY VAS: HOW INTENSE IS YOUR PAIN 0 BEING NO PAIN, 10 BEING THE MOST SEVERE PAIN POSSIBLE?: NO PAIN

## 2023-05-23 ENCOUNTER — RX ONLY (OUTPATIENT)
Age: 33
Setting detail: RX ONLY
End: 2023-05-23

## 2023-05-23 RX ORDER — TRETIONIN 0.25 MG/G
CREAM TOPICAL
Qty: 45 | Refills: 6 | Status: ERX | COMMUNITY
Start: 2023-05-23

## 2023-06-04 ENCOUNTER — HEALTH MAINTENANCE LETTER (OUTPATIENT)
Age: 33
End: 2023-06-04